# Patient Record
Sex: MALE | Race: BLACK OR AFRICAN AMERICAN | NOT HISPANIC OR LATINO | Employment: UNEMPLOYED | URBAN - METROPOLITAN AREA
[De-identification: names, ages, dates, MRNs, and addresses within clinical notes are randomized per-mention and may not be internally consistent; named-entity substitution may affect disease eponyms.]

---

## 2017-12-28 ENCOUNTER — HOSPITAL ENCOUNTER (EMERGENCY)
Facility: HOSPITAL | Age: 32
Discharge: HOME | End: 2017-12-28
Attending: EMERGENCY MEDICINE
Payer: MEDICAID

## 2017-12-28 VITALS
HEART RATE: 80 BPM | HEIGHT: 70 IN | RESPIRATION RATE: 18 BRPM | OXYGEN SATURATION: 99 % | SYSTOLIC BLOOD PRESSURE: 159 MMHG | BODY MASS INDEX: 26.2 KG/M2 | DIASTOLIC BLOOD PRESSURE: 94 MMHG | TEMPERATURE: 99 F | WEIGHT: 183 LBS

## 2017-12-28 DIAGNOSIS — N18.6 ESRD (END STAGE RENAL DISEASE) ON DIALYSIS: Primary | ICD-10-CM

## 2017-12-28 DIAGNOSIS — Z99.2 DIALYSIS PATIENT: ICD-10-CM

## 2017-12-28 DIAGNOSIS — I15.1 HYPERTENSION SECONDARY TO OTHER RENAL DISORDERS: ICD-10-CM

## 2017-12-28 DIAGNOSIS — Z99.2 ESRD (END STAGE RENAL DISEASE) ON DIALYSIS: Primary | ICD-10-CM

## 2017-12-28 LAB
ALBUMIN SERPL BCP-MCNC: 4.1 G/DL
ALP SERPL-CCNC: 83 U/L
ALT SERPL W/O P-5'-P-CCNC: 28 U/L
ANION GAP SERPL CALC-SCNC: 16 MMOL/L
AST SERPL-CCNC: 17 U/L
BASOPHILS # BLD AUTO: 0.05 K/UL
BASOPHILS NFR BLD: 1.3 %
BILIRUB SERPL-MCNC: 1.1 MG/DL
BUN SERPL-MCNC: 65 MG/DL
CALCIUM SERPL-MCNC: 9.7 MG/DL
CHLORIDE SERPL-SCNC: 108 MMOL/L
CO2 SERPL-SCNC: 21 MMOL/L
CREAT SERPL-MCNC: 13.8 MG/DL
DIFFERENTIAL METHOD: ABNORMAL
EOSINOPHIL # BLD AUTO: 0.1 K/UL
EOSINOPHIL NFR BLD: 3.5 %
ERYTHROCYTE [DISTWIDTH] IN BLOOD BY AUTOMATED COUNT: 14.7 %
EST. GFR  (AFRICAN AMERICAN): 5 ML/MIN/1.73 M^2
EST. GFR  (NON AFRICAN AMERICAN): 4 ML/MIN/1.73 M^2
FLUAV AG SPEC QL IA: NEGATIVE
FLUBV AG SPEC QL IA: NEGATIVE
GLUCOSE SERPL-MCNC: 77 MG/DL
HCT VFR BLD AUTO: 37.7 %
HGB BLD-MCNC: 12.2 G/DL
LYMPHOCYTES # BLD AUTO: 1.9 K/UL
LYMPHOCYTES NFR BLD: 52 %
MCH RBC QN AUTO: 30.5 PG
MCHC RBC AUTO-ENTMCNC: 32.4 G/DL
MCV RBC AUTO: 94 FL
MONOCYTES # BLD AUTO: 0.3 K/UL
MONOCYTES NFR BLD: 8.4 %
NEUTROPHILS # BLD AUTO: 1.3 K/UL
NEUTROPHILS NFR BLD: 34.8 %
PLATELET # BLD AUTO: 167 K/UL
PMV BLD AUTO: 11.9 FL
POTASSIUM SERPL-SCNC: 5.2 MMOL/L
PROT SERPL-MCNC: 8.3 G/DL
RBC # BLD AUTO: 4 M/UL
SODIUM SERPL-SCNC: 145 MMOL/L
SPECIMEN SOURCE: NORMAL
WBC # BLD AUTO: 3.71 K/UL

## 2017-12-28 PROCEDURE — 93010 ELECTROCARDIOGRAM REPORT: CPT | Mod: ,,, | Performed by: INTERNAL MEDICINE

## 2017-12-28 PROCEDURE — 87400 INFLUENZA A/B EACH AG IA: CPT | Mod: 59

## 2017-12-28 PROCEDURE — 99284 EMERGENCY DEPT VISIT MOD MDM: CPT | Mod: 25

## 2017-12-28 PROCEDURE — 93005 ELECTROCARDIOGRAM TRACING: CPT

## 2017-12-28 PROCEDURE — 25000003 PHARM REV CODE 250: Performed by: EMERGENCY MEDICINE

## 2017-12-28 PROCEDURE — 85025 COMPLETE CBC W/AUTO DIFF WBC: CPT

## 2017-12-28 PROCEDURE — 80053 COMPREHEN METABOLIC PANEL: CPT

## 2017-12-28 RX ORDER — LISINOPRIL 10 MG/1
10 TABLET ORAL DAILY
COMMUNITY

## 2017-12-28 RX ORDER — FOLIC ACID 1 MG/1
1 TABLET ORAL DAILY
COMMUNITY

## 2017-12-28 RX ORDER — LISINOPRIL 10 MG/1
10 TABLET ORAL
Status: COMPLETED | OUTPATIENT
Start: 2017-12-28 | End: 2017-12-28

## 2017-12-28 RX ADMIN — LISINOPRIL 10 MG: 10 TABLET ORAL at 01:12

## 2017-12-28 NOTE — ED PROVIDER NOTES
SCRIBE #1 NOTE: I, Tarik Gonzales, am scribing for, and in the presence of, Huber Cardoza MD. I have scribed the entire note.      History      Chief Complaint   Patient presents with    Fatigue     emesis, SOB. Receives dialysis MWF. Received dialysis last on Saturday.        Review of patient's allergies indicates:   Allergen Reactions    Bactrim [sulfamethoxazole-trimethoprim]     Sulfa (sulfonamide antibiotics)         HPI   HPI    12/28/2017, 11:34 AM   History obtained from the patient      History of Present Illness: Varghese Paulino is a 32 y.o. male patient who presents to the Emergency Department for fatigue which onset gradually 3 days ago. Sxs are constant, worsening, and moderate in severity. Pt last dialysis was 5 days ago. There are no mitigating or exacerbating factors noted. Associated sxs include nausea, episodic emesis, dizziness, CP.  Pt denies any fever, chills, HA, LOC, blood in stool, ABD pain, SOB, cough, sore throat, and all other sxs at this time. Pt also complaining of right foot pain. No further complaints or concerns at this time.      Arrival mode: Personal vehicle      PCP: [facility]     Past Medical History:  Past medical history reviewed not relevant      Past Surgical History:  Past surgical history reviewed not relevant      Family History:  Family history reviewed not relevant      Social History:  Social History    Social History Main Topics    Social History Main Topics    Smoking status: Unknown if ever smoked    Smokeless tobacco: Unknown if ever used    Alcohol Use: Unknown drinking history    Drug Use: Unknown if ever used    Sexual Activity: Unknown       ROS   Review of Systems   Constitutional: Positive for fatigue. Negative for fever.   HENT: Negative for sore throat.    Respiratory: Negative for shortness of breath.    Cardiovascular: Positive for chest pain.   Gastrointestinal: Positive for nausea and vomiting.   Genitourinary: Negative for  "dysuria.   Musculoskeletal: Negative for back pain.   Skin: Negative for rash.   Neurological: Positive for dizziness. Negative for weakness.   Hematological: Does not bruise/bleed easily.     Physical Exam      Initial Vitals [12/28/17 1059]   BP Pulse Resp Temp SpO2   (!) 190/95 87 18 98.6 °F (37 °C) 99 %      MAP       126.67          Physical Exam  Nursing Notes and Vital Signs Reviewed.  Constitutional: Patient is in no acute distress. Well-developed and well-nourished.  Head: Atraumatic. Normocephalic.  Eyes: PERRL. EOM intact. Conjunctivae are not pale. No scleral icterus.  ENT: Mucous membranes are moist. Oropharynx is clear and symmetric.    Neck: Supple. Full ROM. No lymphadenopathy.  Cardiovascular: Regular rate. Regular rhythm. No murmurs, rubs, or gallops. Distal pulses are 2+ and symmetric.  Pulmonary/Chest: No respiratory distress. Clear to auscultation bilaterally. No wheezing or rales.  Abdominal: Soft and non-distended.  There is no tenderness.  No rebound, guarding, or rigidity. Good bowel sounds.  Genitourinary: No CVA tenderness  Musculoskeletal: Moves all extremities. No obvious deformities. No edema. No calf tenderness.  Skin: Warm and dry. Shunt to LUE. Cath to right upper chest wall.  Neurological:  Alert, awake, and appropriate.  Normal speech.  No acute focal neurological deficits are appreciated.  Psychiatric: Normal affect. Good eye contact. Appropriate in content.    ED Course    Procedures  ED Vital Signs:  Vitals:    12/28/17 1059 12/28/17 1208   BP: (!) 190/95 (!) 159/94   Pulse: 87 80   Resp: 18 18   Temp: 98.6 °F (37 °C)    TempSrc: Oral    SpO2: 99% 99%   Weight: 83 kg (183 lb)    Height: 5' 10" (1.778 m)        Abnormal Lab Results:  Labs Reviewed   CBC W/ AUTO DIFFERENTIAL - Abnormal; Notable for the following:        Result Value    WBC 3.71 (*)     RBC 4.00 (*)     Hemoglobin 12.2 (*)     Hematocrit 37.7 (*)     RDW 14.7 (*)     Gran # 1.3 (*)     Gran% 34.8 (*)     Lymph% " 52.0 (*)     All other components within normal limits   COMPREHENSIVE METABOLIC PANEL - Abnormal; Notable for the following:     Potassium 5.2 (*)     CO2 21 (*)     BUN, Bld 65 (*)     Creatinine 13.8 (*)     Total Bilirubin 1.1 (*)     eGFR if  5 (*)     eGFR if non  4 (*)     All other components within normal limits   INFLUENZA A AND B ANTIGEN        All Lab Results:  Results for orders placed or performed during the hospital encounter of 12/28/17   CBC auto differential   Result Value Ref Range    WBC 3.71 (L) 3.90 - 12.70 K/uL    RBC 4.00 (L) 4.60 - 6.20 M/uL    Hemoglobin 12.2 (L) 14.0 - 18.0 g/dL    Hematocrit 37.7 (L) 40.0 - 54.0 %    MCV 94 82 - 98 fL    MCH 30.5 27.0 - 31.0 pg    MCHC 32.4 32.0 - 36.0 g/dL    RDW 14.7 (H) 11.5 - 14.5 %    Platelets 167 150 - 350 K/uL    MPV 11.9 9.2 - 12.9 fL    Gran # 1.3 (L) 1.8 - 7.7 K/uL    Lymph # 1.9 1.0 - 4.8 K/uL    Mono # 0.3 0.3 - 1.0 K/uL    Eos # 0.1 0.0 - 0.5 K/uL    Baso # 0.05 0.00 - 0.20 K/uL    Gran% 34.8 (L) 38.0 - 73.0 %    Lymph% 52.0 (H) 18.0 - 48.0 %    Mono% 8.4 4.0 - 15.0 %    Eosinophil% 3.5 0.0 - 8.0 %    Basophil% 1.3 0.0 - 1.9 %    Differential Method Automated    Comprehensive metabolic panel   Result Value Ref Range    Sodium 145 136 - 145 mmol/L    Potassium 5.2 (H) 3.5 - 5.1 mmol/L    Chloride 108 95 - 110 mmol/L    CO2 21 (L) 23 - 29 mmol/L    Glucose 77 70 - 110 mg/dL    BUN, Bld 65 (H) 6 - 20 mg/dL    Creatinine 13.8 (H) 0.5 - 1.4 mg/dL    Calcium 9.7 8.7 - 10.5 mg/dL    Total Protein 8.3 6.0 - 8.4 g/dL    Albumin 4.1 3.5 - 5.2 g/dL    Total Bilirubin 1.1 (H) 0.1 - 1.0 mg/dL    Alkaline Phosphatase 83 55 - 135 U/L    AST 17 10 - 40 U/L    ALT 28 10 - 44 U/L    Anion Gap 16 8 - 16 mmol/L    eGFR if African American 5 (A) >60 mL/min/1.73 m^2    eGFR if non African American 4 (A) >60 mL/min/1.73 m^2   Influenza antigen Nasopharyngeal Swab   Result Value Ref Range    Influenza A Ag, EIA Negative Negative     Influenza B Ag, EIA Negative Negative    Flu A & B Source Nasopharyngeal Swab          Imaging Results:  Imaging Results          X-Ray Chest PA And Lateral (Final result)  Result time 12/28/17 11:45:36    Final result by Joel Parry III, MD (12/28/17 11:45:36)                 Impression:     No acute disease identified in the chest.      Electronically signed by: JOEL PARRY MD  Date:     12/28/17  Time:    11:45              Narrative:    XR CHEST PA AND LATERAL    Clinical history: weakness    Findings: The lungs appear clear of active disease. No acute appearing infiltrate, pleural effusion, pneumothorax or other acute disease identified.  The right jugular Vas-Cath is satisfactory in position terminating just below the cavoatrial junction. Cardiomediastinal silhouette is within normal limits.  No acute osseous abnormality detected.                                      The EKG was ordered, reviewed, and independently interpreted by the ED provider.  Interpretation time: 11:34  Rate: 82 BPM  Rhythm: normal sinus rhythm  Interpretation: No acute ST changes. Early repolarrization. No STEMI.      The Emergency Provider reviewed the vital signs and test results, which are outlined above.    ED Discussion     12:23 PM: Reassessed pt. Pt states their condition has improved at this time.  Discussed with pt all pertinent ED information and results. Discussed plan of treatment with pt. Gave pt all f/u and return to the ED instructions. All questions and concerns were addressed at this time. Pt understands and agrees to plan as discussed. Pt is stable for discharge.       ED Medication(s):  Medications   lisinopril tablet 10 mg (10 mg Oral Given 12/28/17 1300)       Discharge Medication List as of 12/28/2017 12:18 PM          Follow-up Information     PCP In 1 day.                   Medical Decision Making    Medical Decision Making:   Clinical Tests:   Lab Tests: Reviewed and Ordered  Radiological Study: Reviewed and  Ordered  Medical Tests: Reviewed and Ordered           Scribe Attestation:   Scribe #1: I performed the above scribed service and the documentation accurately describes the services I performed. I attest to the accuracy of the note.    Attending:   Physician Attestation Statement for Scribe #1: I, Huber Cardoza MD, personally performed the services described in this documentation, as scribed by Tarik Gonzales, in my presence, and it is both accurate and complete.          Clinical Impression       ICD-10-CM ICD-9-CM   1. ESRD (end stage renal disease) on dialysis N18.6 585.6    Z99.2 V45.11   2. Dialysis patient Z99.2 V45.11   3. Hypertension secondary to other renal disorders I15.1 405.91    N28.89 593.89       Disposition:   Disposition: Discharged  Condition: Stable         Huber Cardoza MD  12/28/17 3165

## 2018-01-02 ENCOUNTER — HOSPITAL ENCOUNTER (EMERGENCY)
Facility: HOSPITAL | Age: 33
Discharge: HOME | End: 2018-01-02
Attending: EMERGENCY MEDICINE
Payer: MEDICAID

## 2018-01-02 VITALS
OXYGEN SATURATION: 99 % | HEART RATE: 100 BPM | HEIGHT: 71 IN | BODY MASS INDEX: 25.16 KG/M2 | TEMPERATURE: 99 F | WEIGHT: 179.69 LBS | SYSTOLIC BLOOD PRESSURE: 134 MMHG | RESPIRATION RATE: 15 BRPM | DIASTOLIC BLOOD PRESSURE: 77 MMHG

## 2018-01-02 DIAGNOSIS — I50.1 PULMONARY EDEMA CARDIAC CAUSE: ICD-10-CM

## 2018-01-02 DIAGNOSIS — R07.9 CHEST PAIN: ICD-10-CM

## 2018-01-02 DIAGNOSIS — R11.0 NAUSEA: ICD-10-CM

## 2018-01-02 DIAGNOSIS — I10 ESSENTIAL HYPERTENSION: ICD-10-CM

## 2018-01-02 DIAGNOSIS — Z91.199 NON COMPLIANCE WITH MEDICAL TREATMENT: ICD-10-CM

## 2018-01-02 DIAGNOSIS — N18.6 ESRD (END STAGE RENAL DISEASE): ICD-10-CM

## 2018-01-02 DIAGNOSIS — R06.02 SHORTNESS OF BREATH: ICD-10-CM

## 2018-01-02 DIAGNOSIS — N18.6 ESRD (END STAGE RENAL DISEASE) ON DIALYSIS: Primary | ICD-10-CM

## 2018-01-02 DIAGNOSIS — Z99.2 ESRD (END STAGE RENAL DISEASE) ON DIALYSIS: Primary | ICD-10-CM

## 2018-01-02 LAB
ALBUMIN SERPL BCP-MCNC: 4.1 G/DL
ALP SERPL-CCNC: 79 U/L
ALT SERPL W/O P-5'-P-CCNC: 14 U/L
ANION GAP SERPL CALC-SCNC: 17 MMOL/L
AST SERPL-CCNC: 16 U/L
BASOPHILS # BLD AUTO: 0.06 K/UL
BASOPHILS NFR BLD: 1.6 %
BILIRUB SERPL-MCNC: 1.5 MG/DL
BNP SERPL-MCNC: 62 PG/ML
BUN SERPL-MCNC: 86 MG/DL
CALCIUM SERPL-MCNC: 9.4 MG/DL
CHLORIDE SERPL-SCNC: 109 MMOL/L
CO2 SERPL-SCNC: 16 MMOL/L
CREAT SERPL-MCNC: 17.2 MG/DL
DIFFERENTIAL METHOD: ABNORMAL
EOSINOPHIL # BLD AUTO: 0.1 K/UL
EOSINOPHIL NFR BLD: 3.2 %
ERYTHROCYTE [DISTWIDTH] IN BLOOD BY AUTOMATED COUNT: 14.9 %
EST. GFR  (AFRICAN AMERICAN): 4 ML/MIN/1.73 M^2
EST. GFR  (NON AFRICAN AMERICAN): 3 ML/MIN/1.73 M^2
GLUCOSE SERPL-MCNC: 78 MG/DL
HCT VFR BLD AUTO: 35.9 %
HGB BLD-MCNC: 11.6 G/DL
LYMPHOCYTES # BLD AUTO: 1.8 K/UL
LYMPHOCYTES NFR BLD: 46.4 %
MCH RBC QN AUTO: 30.2 PG
MCHC RBC AUTO-ENTMCNC: 32.3 G/DL
MCV RBC AUTO: 94 FL
MONOCYTES # BLD AUTO: 0.3 K/UL
MONOCYTES NFR BLD: 8.2 %
NEUTROPHILS # BLD AUTO: 1.5 K/UL
NEUTROPHILS NFR BLD: 40.6 %
PLATELET # BLD AUTO: 164 K/UL
PMV BLD AUTO: 12.1 FL
POTASSIUM SERPL-SCNC: 5 MMOL/L
PROT SERPL-MCNC: 8 G/DL
RBC # BLD AUTO: 3.84 M/UL
SODIUM SERPL-SCNC: 142 MMOL/L
TROPONIN I SERPL DL<=0.01 NG/ML-MCNC: <0.006 NG/ML
WBC # BLD AUTO: 3.77 K/UL

## 2018-01-02 PROCEDURE — 96375 TX/PRO/DX INJ NEW DRUG ADDON: CPT

## 2018-01-02 PROCEDURE — 96374 THER/PROPH/DIAG INJ IV PUSH: CPT

## 2018-01-02 PROCEDURE — 63600175 PHARM REV CODE 636 W HCPCS: Performed by: INTERNAL MEDICINE

## 2018-01-02 PROCEDURE — 84484 ASSAY OF TROPONIN QUANT: CPT

## 2018-01-02 PROCEDURE — 80053 COMPREHEN METABOLIC PANEL: CPT

## 2018-01-02 PROCEDURE — 83880 ASSAY OF NATRIURETIC PEPTIDE: CPT

## 2018-01-02 PROCEDURE — 99285 EMERGENCY DEPT VISIT HI MDM: CPT | Mod: ,,, | Performed by: INTERNAL MEDICINE

## 2018-01-02 PROCEDURE — 63600175 PHARM REV CODE 636 W HCPCS: Performed by: EMERGENCY MEDICINE

## 2018-01-02 PROCEDURE — 93010 ELECTROCARDIOGRAM REPORT: CPT | Mod: ,,, | Performed by: INTERNAL MEDICINE

## 2018-01-02 PROCEDURE — 85025 COMPLETE CBC W/AUTO DIFF WBC: CPT

## 2018-01-02 PROCEDURE — 80100016 HC MAINTENANCE HEMODIALYSIS

## 2018-01-02 PROCEDURE — 99284 EMERGENCY DEPT VISIT MOD MDM: CPT | Mod: 25

## 2018-01-02 RX ORDER — ONDANSETRON 2 MG/ML
4 INJECTION INTRAMUSCULAR; INTRAVENOUS ONCE
Status: DISCONTINUED | OUTPATIENT
Start: 2018-01-02 | End: 2018-01-02

## 2018-01-02 RX ORDER — ONDANSETRON 2 MG/ML
4 INJECTION INTRAMUSCULAR; INTRAVENOUS ONCE
Status: COMPLETED | OUTPATIENT
Start: 2018-01-02 | End: 2018-01-02

## 2018-01-02 RX ORDER — ONDANSETRON 4 MG/1
4 TABLET, ORALLY DISINTEGRATING ORAL EVERY 8 HOURS PRN
Qty: 12 TABLET | Refills: 0 | Status: SHIPPED | OUTPATIENT
Start: 2018-01-02

## 2018-01-02 RX ORDER — HEPARIN SODIUM 1000 [USP'U]/ML
5000 INJECTION, SOLUTION INTRAVENOUS; SUBCUTANEOUS
Status: DISCONTINUED | OUTPATIENT
Start: 2018-01-02 | End: 2018-01-02 | Stop reason: HOSPADM

## 2018-01-02 RX ADMIN — HEPARIN SODIUM 5000 UNITS: 1000 INJECTION, SOLUTION INTRAVENOUS; SUBCUTANEOUS at 03:01

## 2018-01-02 RX ADMIN — ONDANSETRON 4 MG: 2 INJECTION, SOLUTION INTRAMUSCULAR; INTRAVENOUS at 12:01

## 2018-01-02 NOTE — SUBJECTIVE & OBJECTIVE
Past Medical History:   Diagnosis Date    Hypertension     Pulmonary embolus, left     Renal disorder     dialysis 3x wk       Past Surgical History:   Procedure Laterality Date    AV FISTULA PLACEMENT      left antecubical    FOOT SURGERY      right 3rd toe amputated    vas cath placement      r chestwall       Review of patient's allergies indicates:   Allergen Reactions    Bactrim [sulfamethoxazole-trimethoprim]     Sulfa (sulfonamide antibiotics)      Current Facility-Administered Medications   Medication Frequency    heparin (porcine) injection 5,000 Units PRN     Current Outpatient Prescriptions   Medication    apixaban 2.5 mg Tab    folic acid (FOLVITE) 1 MG tablet    lisinopril 10 MG tablet     Family History     Problem Relation (Age of Onset)    Diabetes Mother    Heart disease Mother    Hypertension Father        Social History Main Topics    Smoking status: Never Smoker    Smokeless tobacco: Never Used    Alcohol use No    Drug use: No    Sexual activity: Not on file     Review of Systems   Constitutional: Positive for fatigue.   HENT: Negative.    Respiratory: Positive for shortness of breath.    Cardiovascular: Negative.    Gastrointestinal: Negative.    Genitourinary: Negative.    Musculoskeletal: Negative.    Psychiatric/Behavioral: Negative.      Objective:     Vital Signs (Most Recent):  Temp: (!) 57.2 °F (14 °C) (01/02/18 1530)  Pulse: 100 (01/02/18 1600)  Resp: 15 (01/02/18 1246)  BP: 130/86 (01/02/18 1600)  SpO2: 100 % (01/02/18 1246)  O2 Device (Oxygen Therapy): room air (01/02/18 1007) Vital Signs (24h Range):  Temp:  [57.2 °F (14 °C)-98.5 °F (36.9 °C)] 57.2 °F (14 °C)  Pulse:  [] 100  Resp:  [15-19] 15  SpO2:  [100 %] 100 %  BP: (130-202)/() 130/86     Weight: 81.5 kg (179 lb 10.8 oz) (01/02/18 1007)  Body mass index is 25.42 kg/m².  Body surface area is 2.01 meters squared.    No intake/output data recorded.    Physical Exam   Constitutional: He is oriented to  person, place, and time. He appears well-developed and well-nourished.   HENT:   Head: Normocephalic and atraumatic.   Cardiovascular: Normal rate, regular rhythm and normal heart sounds.  Exam reveals no friction rub.    Pulmonary/Chest: Effort normal. He has rales.   Abdominal: Soft. Bowel sounds are normal. There is no tenderness.   Musculoskeletal: He exhibits edema.   Neurological: He is alert and oriented to person, place, and time.   Skin: Skin is dry.   Psychiatric: He has a normal mood and affect. His behavior is normal.   Nursing note and vitals reviewed.      Significant Labs: reviewed  BMP  Lab Results   Component Value Date     01/02/2018    K 5.0 01/02/2018     01/02/2018    CO2 16 (L) 01/02/2018    BUN 86 (H) 01/02/2018    CREATININE 17.2 (H) 01/02/2018    CALCIUM 9.4 01/02/2018    ANIONGAP 17 (H) 01/02/2018    ESTGFRAFRICA 4 (A) 01/02/2018    EGFRNONAA 3 (A) 01/02/2018     Lab Results   Component Value Date    WBC 3.77 (L) 01/02/2018    HGB 11.6 (L) 01/02/2018    HCT 35.9 (L) 01/02/2018    MCV 94 01/02/2018     01/02/2018       Significant Imaging: CXR reviewed, pulmonary edema

## 2018-01-02 NOTE — HPI
Pt was seen and examined. H/o reviewed. Pt is a 34 y/o male with ESRD on chronic HD q MWF at [facility] who presented to ER with SOB. Pt has been in [place] since Christmas and has not been dialyzed since. Pt's excuse is that he did not expect to stay this long. He did not make arrangements through his SW to [place] HD in one of the local out pt units. He has SOB, but no CP, no fever, no cough, on presentation. The SOB had built up slowly in the past few days. Orders for HD was given and discussed with HD nurse. He was revisited and reevalauted by me during HD treatment. He was tolerating HD well, no new c/o's. No discomfort during HD.

## 2018-01-02 NOTE — ED PROVIDER NOTES
SCRIBE #1 NOTE: I, Natalie Chang, am scribing for, and in the presence of, Anthony Carrasco MD. I have scribed the entire note.      History      Chief Complaint   Patient presents with    Shortness of Breath     Nausea. Has not has dialysis since Saturday before Christmas.        Review of patient's allergies indicates:   Allergen Reactions    Bactrim [sulfamethoxazole-trimethoprim]     Sulfa (sulfonamide antibiotics)         HPI   HPI    1/2/2018, 10:52 AM   History obtained from the patient      History of Present Illness: Varghese Paulino is a 32 y.o. male patient who presents to the Emergency Department for SOB which onset gradually two days ago. Pt stated he is in town visiting for the holidays and his cousins were suppose to come get him so he can go to dialysis. Pt stated every time he calls them his cousins tell him they are on their way. Pt reports having to dialyze three times a week. Pt reports being compliant with dialysis besides this encounter. Symptoms are constant and moderate in severity. No mitigating or exacerbating factors reported. Associated sxs include CP, n/v, and fatigue. Patient denies any fever, chills, recent travel/long car trips, dizziness, HA, light-headedness, extremity weakness/numbness, and all other sxs at this time. No prior Tx reported. No further complaints or concerns at this time.         Arrival mode: Personal vehicle    PCP: [facility]       Past Medical History:  Past Medical History:   Diagnosis Date    Hypertension     Pulmonary embolus, left     Renal disorder     dialysis 3x wk       Past Surgical History:  Past Surgical History:   Procedure Laterality Date    AV FISTULA PLACEMENT      left antecubical    FOOT SURGERY      right 3rd toe amputated    vas cath placement      r chestwall         Family History:  Family History   Problem Relation Age of Onset    Heart disease Mother     Diabetes Mother     Hypertension Father        Social History:  Social  History     Social History Main Topics    Smoking status: Never Smoker    Smokeless tobacco: Never Used    Alcohol use No    Drug use: No    Sexual activity: Unknown       ROS   Review of Systems   Constitutional: Positive for fatigue. Negative for chills and fever.        (-) Recent travel/long car trips   HENT: Negative for sore throat.    Respiratory: Positive for shortness of breath.    Cardiovascular: Positive for chest pain.   Gastrointestinal: Positive for nausea and vomiting.   Genitourinary: Negative for dysuria and hematuria.   Musculoskeletal: Negative for back pain.   Skin: Negative for rash.   Neurological: Negative for dizziness, weakness, light-headedness, numbness and headaches.   Hematological: Does not bruise/bleed easily.   All other systems reviewed and are negative.    Physical Exam      Initial Vitals [01/02/18 1007]   BP Pulse Resp Temp SpO2   (!) 202/106 90 16 97.8 °F (36.6 °C) 100 %      MAP       138          Physical Exam  Nursing Notes and Vital Signs Reviewed.  Constitutional: Patient is in no acute distress. Well-developed and well-nourished.  Head: Atraumatic. Normocephalic.  Eyes: PERRL. EOM intact. Conjunctivae are not pale. No scleral icterus.  ENT: Mucous membranes are moist. Oropharynx is clear and symmetric.    Neck: Supple. Full ROM. No lymphadenopathy.  Cardiovascular: Regular rate. Regular rhythm. No murmurs, rubs, or gallops. Distal pulses are 2+ and symmetric.  Pulmonary/Chest: No respiratory distress. Clear to auscultation bilaterally. No wheezing or rales.  Abdominal: Soft and non-distended.  There is no tenderness.  No rebound, guarding, or rigidity. Good bowel sounds.  Musculoskeletal: Moves all extremities. No obvious deformities. No edema. No calf tenderness.  Skin: Warm and dry.  Neurological:  Alert, awake, and appropriate.  Normal speech.  No acute focal neurological deficits are appreciated.  Psychiatric: Normal affect. Good eye contact. Appropriate in  content.    ED Course    Procedures  ED Vital Signs:  Vitals:    01/02/18 1044 01/02/18 1102 01/02/18 1246 01/02/18 1325   BP: (!) 160/88 (!) 158/96 (!) 166/96 (!) 154/87   Pulse: 84 81 80 80   Resp: 19 16 15    Temp:    98.5 °F (36.9 °C)   TempSrc:       SpO2: 100% 100% 100%    Weight:       Height:        01/02/18 1330 01/02/18 1400 01/02/18 1430 01/02/18 1500   BP: (!) 147/93 (!) 153/92 (!) 146/90 (!) 143/94   Pulse: 80 84 88 94   Resp:       Temp:       TempSrc:       SpO2:       Weight:       Height:        01/02/18 1530 01/02/18 1600 01/02/18 1630 01/02/18 1700   BP: (!) 144/93 130/86 120/75 124/74   Pulse: 100 100 108 106   Resp:       Temp:       TempSrc:       SpO2:       Weight:       Height:        01/02/18 1730 01/02/18 1741 01/02/18 1802   BP: 123/79 123/78 134/77   Pulse: 104 102 100   Resp:      Temp:  98 °F (36.7 °C) 98.8 °F (37.1 °C)   TempSrc:   Oral   SpO2:   99%   Weight:      Height:          Abnormal Lab Results:  Labs Reviewed   CBC W/ AUTO DIFFERENTIAL - Abnormal; Notable for the following:        Result Value    WBC 3.77 (*)     RBC 3.84 (*)     Hemoglobin 11.6 (*)     Hematocrit 35.9 (*)     RDW 14.9 (*)     Gran # 1.5 (*)     All other components within normal limits   COMPREHENSIVE METABOLIC PANEL - Abnormal; Notable for the following:     CO2 16 (*)     BUN, Bld 86 (*)     Creatinine 17.2 (*)     Total Bilirubin 1.5 (*)     Anion Gap 17 (*)     eGFR if  4 (*)     eGFR if non  3 (*)     All other components within normal limits   TROPONIN I   B-TYPE NATRIURETIC PEPTIDE        All Lab Results:  Results for orders placed or performed during the hospital encounter of 01/02/18   CBC auto differential   Result Value Ref Range    WBC 3.77 (L) 3.90 - 12.70 K/uL    RBC 3.84 (L) 4.60 - 6.20 M/uL    Hemoglobin 11.6 (L) 14.0 - 18.0 g/dL    Hematocrit 35.9 (L) 40.0 - 54.0 %    MCV 94 82 - 98 fL    MCH 30.2 27.0 - 31.0 pg    MCHC 32.3 32.0 - 36.0 g/dL    RDW 14.9 (H)  11.5 - 14.5 %    Platelets 164 150 - 350 K/uL    MPV 12.1 9.2 - 12.9 fL    Gran # 1.5 (L) 1.8 - 7.7 K/uL    Lymph # 1.8 1.0 - 4.8 K/uL    Mono # 0.3 0.3 - 1.0 K/uL    Eos # 0.1 0.0 - 0.5 K/uL    Baso # 0.06 0.00 - 0.20 K/uL    Gran% 40.6 38.0 - 73.0 %    Lymph% 46.4 18.0 - 48.0 %    Mono% 8.2 4.0 - 15.0 %    Eosinophil% 3.2 0.0 - 8.0 %    Basophil% 1.6 0.0 - 1.9 %    Differential Method Automated    Comprehensive metabolic panel   Result Value Ref Range    Sodium 142 136 - 145 mmol/L    Potassium 5.0 3.5 - 5.1 mmol/L    Chloride 109 95 - 110 mmol/L    CO2 16 (L) 23 - 29 mmol/L    Glucose 78 70 - 110 mg/dL    BUN, Bld 86 (H) 6 - 20 mg/dL    Creatinine 17.2 (H) 0.5 - 1.4 mg/dL    Calcium 9.4 8.7 - 10.5 mg/dL    Total Protein 8.0 6.0 - 8.4 g/dL    Albumin 4.1 3.5 - 5.2 g/dL    Total Bilirubin 1.5 (H) 0.1 - 1.0 mg/dL    Alkaline Phosphatase 79 55 - 135 U/L    AST 16 10 - 40 U/L    ALT 14 10 - 44 U/L    Anion Gap 17 (H) 8 - 16 mmol/L    eGFR if African American 4 (A) >60 mL/min/1.73 m^2    eGFR if non African American 3 (A) >60 mL/min/1.73 m^2   Troponin I #1   Result Value Ref Range    Troponin I <0.006 0.000 - 0.026 ng/mL   B-Type natriuretic peptide (BNP)   Result Value Ref Range    BNP 62 0 - 99 pg/mL         Imaging Results:  Imaging Results          X-Ray Chest AP Portable (Final result)  Result time 01/02/18 10:57:34    Final result by ADELE Johnson Sr., MD (01/02/18 10:57:34)                 Impression:        1. There has been interval development of a streaky opacity in the base of the left lung. This is characteristic of subsegmental atelectasis.  2. A right subclavian venous line remains in place.       Electronically signed by: ADELE JOHNSON MD  Date:     01/02/18  Time:    10:57              Narrative:    One-view chest x-ray    Clinical History: Chest pain    Finding: Comparison was made to a prior examination performed on 12/28/2017. A right subclavian venous line remains in place. The size of the heart  is normal. There has been interval development of a streaky opacity in the base of the left lung. The right lung is clear. There is no pneumothorax.  The costophrenic angles are sharp.                             The EKG was ordered, reviewed, and independently interpreted by the ED provider.  Interpretation time: 1038  Rate: 85 BPM  Rhythm: normal sinus rhythm  Interpretation: Normal EKG. No STEMI.         The Emergency Provider reviewed the vital signs and test results, which are outlined above.    ED Discussion     12:11 PM: Dr. Carrasco discussed the pt's case with Dr. Varela (Nephrology) who recommends pt will be dialyzed here and then will be able to be discharged home.  The patient agrees with this treatment plan.    4:14 PM: Reassessed pt at this time.  Pt states his condition has improved at this time. Discussed with pt all pertinent ED information and results. Discussed pt dx and plan of tx. Gave pt all f/u and return to the ED instructions. All questions and concerns were addressed at this time. Pt expresses understanding of information and instructions, and is comfortable with plan to discharge. Pt is stable for discharge.    ED Medication(s):  Medications   ondansetron injection 4 mg (4 mg Intravenous Given 1/2/18 1212)       Discharge Medication List as of 1/2/2018  5:57 PM      START taking these medications    Details   ondansetron (ZOFRAN-ODT) 4 MG TbDL Take 1 tablet (4 mg total) by mouth every 8 (eight) hours as needed., Starting Tue 1/2/2018, Print             Follow-up Information     [facility]. Schedule an appointment as soon as possible for a visit in 2 days.    Why:  Follow up with your primary doctor in the next 2-3 days for a re-check.  Return to the emergency department for any worsening signs or symptoms.                   Medical Decision Making    Medical Decision Making:   Clinical Tests:   Lab Tests: Ordered and Reviewed  Radiological Study: Ordered and Reviewed  Medical Tests:  Ordered and Reviewed           Scribe Attestation:   Scribe #1: I performed the above scribed service and the documentation accurately describes the services I performed. I attest to the accuracy of the note.    Attending:   Physician Attestation Statement for Scribe #1: I, Anthony Carrasco MD, personally performed the services described in this documentation, as scribed by Natalie Chang, in my presence, and it is both accurate and complete.          Clinical Impression       ICD-10-CM ICD-9-CM   1. ESRD (end stage renal disease) on dialysis N18.6 585.6    Z99.2 V45.11   2. Chest pain R07.9 786.50   3. ESRD (end stage renal disease) N18.6 585.6   4. Shortness of breath R06.02 786.05   5. Essential hypertension I10 401.9   6. Pulmonary edema cardiac cause I50.1 514   7. Non compliance with medical treatment Z91.19 V15.81   8. Nausea R11.0 787.02       Disposition:   Disposition: Discharged  Condition: Stable         Anthony Carrasco MD  01/03/18 9590

## 2018-01-02 NOTE — PROGRESS NOTES
Pt completed 4 hours of HD removing 2.5L. Dr. Varela @ bedside discussing compliance with pt. Pt tolerated HD well. No signs of distress noted. Vas cath de accessed per p&p  Hep locked 2.2ml x 2.

## 2018-01-02 NOTE — ASSESSMENT & PLAN NOTE
Renal: ESRD pt form [facility]  q MWF HD  Pt has not dialyzed for about 10 days.  Pulmonary edema  Hyperkalemia  Metabolic acidosis  HTN: due to fluid overload and pulmonary edema  BP markedly improved during HD, as reviewed with UF  Tolerating HD well. Continue HD  Risk of sudden cardiac death with not dialyzing repeatedly explained to pt.  Pt was informed that he actually can get the SW in his HD unit arrange out pt HD treatment for him when he goes to a new location.  Discussed with ER  Pt being discharged directly from the ER post HD today  Pt was strongly advised to return to his HD unit at [facility] tomorrow.  Opportunity for questions and discussion provided.  Total time spent 70 minutes including time needed to review the records, the   patient evaluation, documentation, face-to-face discussion with the patient,   more than 50% of the time was spent on coordination of care and counseling.    Level V visit.  Pt received multiple visits and evaluations.

## 2018-01-02 NOTE — CONSULTS
[facility]  Nephrology  Consult Note    Patient Name: Varghese Paulino  MRN: [id number]  Admission Date: 1/2/2018  Hospital Length of Stay: 0 days  Attending Provider: Anthony Carrasco MD   Primary Care Physician: Provider Notinsystem  Principal Problem: ESRD on HD, SOB    Referring physician: Dr. Carrasco  Reason for referral: ESRD on HD      Consults  Subjective:     HPI: Pt was seen and examined. H/o reviewed. Pt is a 32 y/o male with ESRD on chronic HD q MWF at [facility] who presented to ER with SOB. Pt has been in [place] since [place] and has not been dialyzed since. Pt's excuse is that he did not expect to stay this long. He did not make arrangements through his SW to [place] HD in one of the local out pt units. He has SOB, but no CP, no fever, no cough, on presentation. The SOB had built up slowly in the past few days. Orders for HD was given and discussed with HD nurse. He was revisited and reevalauted by me during HD treatment. He was tolerating HD well, no new c/o's. No discomfort during HD.    Past Medical History:   Diagnosis Date    Hypertension     Pulmonary embolus, left     Renal disorder     dialysis 3x wk       Past Surgical History:   Procedure Laterality Date    AV FISTULA PLACEMENT      left antecubical    FOOT SURGERY      right 3rd toe amputated    vas cath placement      r chestwall       Review of patient's allergies indicates:   Allergen Reactions    Bactrim [sulfamethoxazole-trimethoprim]     Sulfa (sulfonamide antibiotics)      Current Facility-Administered Medications   Medication Frequency    heparin (porcine) injection 5,000 Units PRN     Current Outpatient Prescriptions   Medication    apixaban 2.5 mg Tab    folic acid (FOLVITE) 1 MG tablet    lisinopril 10 MG tablet     Family History     Problem Relation (Age of Onset)    Diabetes Mother    Heart disease Mother    Hypertension Father        Social History Main Topics    Smoking status: Never Smoker    Smokeless  tobacco: Never Used    Alcohol use No    Drug use: No    Sexual activity: Not on file     Review of Systems   Constitutional: Positive for fatigue.   HENT: Negative.    Respiratory: Positive for shortness of breath.    Cardiovascular: Negative.    Gastrointestinal: Negative.    Genitourinary: Negative.    Musculoskeletal: Negative.    Psychiatric/Behavioral: Negative.      Objective:     Vital Signs (Most Recent):  Temp: (!) 57.2 °F (14 °C) (01/02/18 1530)  Pulse: 100 (01/02/18 1600)  Resp: 15 (01/02/18 1246)  BP: 130/86 (01/02/18 1600)  SpO2: 100 % (01/02/18 1246)  O2 Device (Oxygen Therapy): room air (01/02/18 1007) Vital Signs (24h Range):  Temp:  [57.2 °F (14 °C)-98.5 °F (36.9 °C)] 57.2 °F (14 °C)  Pulse:  [] 100  Resp:  [15-19] 15  SpO2:  [100 %] 100 %  BP: (130-202)/() 130/86     Weight: 81.5 kg (179 lb 10.8 oz) (01/02/18 1007)  Body mass index is 25.42 kg/m².  Body surface area is 2.01 meters squared.    No intake/output data recorded.    Physical Exam   Constitutional: He is oriented to person, place, and time. He appears well-developed and well-nourished.   HENT:   Head: Normocephalic and atraumatic.   Cardiovascular: Normal rate, regular rhythm and normal heart sounds.  Exam reveals no friction rub.    Pulmonary/Chest: Effort normal. He has rales.   Abdominal: Soft. Bowel sounds are normal. There is no tenderness.   Musculoskeletal: He exhibits edema.   Neurological: He is alert and oriented to person, place, and time.   Skin: Skin is dry.   Psychiatric: He has a normal mood and affect. His behavior is normal.   Nursing note and vitals reviewed.      Significant Labs: reviewed  BMP  Lab Results   Component Value Date     01/02/2018    K 5.0 01/02/2018     01/02/2018    CO2 16 (L) 01/02/2018    BUN 86 (H) 01/02/2018    CREATININE 17.2 (H) 01/02/2018    CALCIUM 9.4 01/02/2018    ANIONGAP 17 (H) 01/02/2018    ESTGFRAFRICA 4 (A) 01/02/2018    EGFRNONAA 3 (A) 01/02/2018     Lab  Results   Component Value Date    WBC 3.77 (L) 01/02/2018    HGB 11.6 (L) 01/02/2018    HCT 35.9 (L) 01/02/2018    MCV 94 01/02/2018     01/02/2018       Significant Imaging: CXR reviewed, pulmonary edema    Assessment/Plan:   31 y/o male with ESRD on chronic HD presented to ER with SOB afer missing Hd for about 10 days:    ESRD (end stage renal disease)    Renal: ESRD [place] form [place]  q MWF HD  Pt has not dialyzed for about 10 days.  Pulmonary edema  Hyperkalemia  Metabolic acidosis  HTN: due to fluid overload and pulmonary edema  BP markedly improved during HD, as reviewed with UF  Tolerating HD well. Continue HD  Risk of sudden cardiac death with not dialyzing repeatedly explained to pt.  Pt was informed that he actually can get the SW in his HD unit arrange out pt HD treatment for him when he goes to a new location.  Discussed with ER  Pt being discharged directly from the ER post HD today  Pt was strongly advised to return to his HD unit at [facility] tomorrow.  Opportunity for questions and discussion provided.  Total time spent 70 minutes including time needed to review the records, the   patient evaluation, documentation, face-to-face discussion with the patient,   more than 50% of the time was spent on coordination of care and counseling.    Level V visit.  Pt received multiple visits and evaluations.              Hung Varela MD  Nephrology  [facility]

## 2018-09-17 ENCOUNTER — OFFICE VISIT (OUTPATIENT)
Dept: PODIATRY | Facility: CLINIC | Age: 33
End: 2018-09-17
Payer: MEDICARE

## 2018-09-17 VITALS
SYSTOLIC BLOOD PRESSURE: 140 MMHG | RESPIRATION RATE: 16 BRPM | DIASTOLIC BLOOD PRESSURE: 84 MMHG | HEIGHT: 71 IN | BODY MASS INDEX: 21.14 KG/M2 | HEART RATE: 106 BPM | WEIGHT: 151 LBS

## 2018-09-17 DIAGNOSIS — M24.572 CONTRACTURE, LEFT ANKLE: ICD-10-CM

## 2018-09-17 DIAGNOSIS — L97.512 DIABETIC ULCER OF OTHER PART OF RIGHT FOOT ASSOCIATED WITH TYPE 2 DIABETES MELLITUS, WITH FAT LAYER EXPOSED: ICD-10-CM

## 2018-09-17 DIAGNOSIS — Z89.421 ACQUIRED ABSENCE OF OTHER RIGHT TOE(S): ICD-10-CM

## 2018-09-17 DIAGNOSIS — E11.42 TYPE 2 DIABETES MELLITUS WITH PERIPHERAL NEUROPATHY: ICD-10-CM

## 2018-09-17 DIAGNOSIS — L84 CALLUS: ICD-10-CM

## 2018-09-17 DIAGNOSIS — N18.6 TYPE 2 DIABETES MELLITUS WITH ESRD (END-STAGE RENAL DISEASE): ICD-10-CM

## 2018-09-17 DIAGNOSIS — E11.621 DIABETIC ULCER OF OTHER PART OF RIGHT FOOT ASSOCIATED WITH TYPE 2 DIABETES MELLITUS, WITH FAT LAYER EXPOSED: ICD-10-CM

## 2018-09-17 DIAGNOSIS — M77.41 METATARSALGIA, RIGHT FOOT: ICD-10-CM

## 2018-09-17 DIAGNOSIS — L85.3 XEROSIS CUTIS: ICD-10-CM

## 2018-09-17 DIAGNOSIS — M20.42 HAMMER TOES OF BOTH FEET: ICD-10-CM

## 2018-09-17 DIAGNOSIS — M24.571 CONTRACTURE, RIGHT ANKLE: ICD-10-CM

## 2018-09-17 DIAGNOSIS — M20.41 HAMMER TOES OF BOTH FEET: ICD-10-CM

## 2018-09-17 DIAGNOSIS — M77.42 METATARSALGIA OF LEFT FOOT: ICD-10-CM

## 2018-09-17 DIAGNOSIS — B35.1 ONYCHOMYCOSIS: ICD-10-CM

## 2018-09-17 DIAGNOSIS — E11.22 TYPE 2 DIABETES MELLITUS WITH ESRD (END-STAGE RENAL DISEASE): ICD-10-CM

## 2018-09-17 DIAGNOSIS — N18.6 ESRD (END STAGE RENAL DISEASE): ICD-10-CM

## 2018-09-17 DIAGNOSIS — E11.9 ENCOUNTER FOR COMPREHENSIVE DIABETIC FOOT EXAMINATION, TYPE 2 DIABETES MELLITUS: Primary | ICD-10-CM

## 2018-09-17 PROCEDURE — 11057 PARNG/CUTG B9 HYPRKR LES >4: CPT | Mod: Q7,S$PBB,, | Performed by: PODIATRIST

## 2018-09-17 PROCEDURE — 11042 DBRDMT SUBQ TIS 1ST 20SQCM/<: CPT | Mod: 59,S$PBB,, | Performed by: PODIATRIST

## 2018-09-17 PROCEDURE — 99203 OFFICE O/P NEW LOW 30 MIN: CPT | Mod: 25,S$PBB,, | Performed by: PODIATRIST

## 2018-09-17 PROCEDURE — 99999 PR PBB SHADOW E&M-EST. PATIENT-LVL III: CPT | Mod: PBBFAC,,, | Performed by: PODIATRIST

## 2018-09-17 PROCEDURE — 11042 DBRDMT SUBQ TIS 1ST 20SQCM/<: CPT | Mod: PBBFAC,RT,59 | Performed by: PODIATRIST

## 2018-09-17 PROCEDURE — 11721 DEBRIDE NAIL 6 OR MORE: CPT | Mod: Q7,59,S$PBB, | Performed by: PODIATRIST

## 2018-09-17 PROCEDURE — 11721 DEBRIDE NAIL 6 OR MORE: CPT | Mod: Q7,59,PBBFAC | Performed by: PODIATRIST

## 2018-09-17 PROCEDURE — 11057 PARNG/CUTG B9 HYPRKR LES >4: CPT | Mod: 59,Q7,PBBFAC | Performed by: PODIATRIST

## 2018-09-17 PROCEDURE — 99213 OFFICE O/P EST LOW 20 MIN: CPT | Mod: PBBFAC,25 | Performed by: PODIATRIST

## 2018-09-17 NOTE — PROGRESS NOTES
Subjective:       Patient ID: Varghese Paulino is a 32 y.o. male.    Chief Complaint: Routine Foot Care (possible billateral plantar callus no c/o pain, wears slippers w/ socks, non-diabetic pt, PCP  )      HPI: Varghese Paulino presents to the office today, under referral by their Primary Care Provider, [facility], for his annual diabetic foot assessment and risk evalution Patient is a DMII. Patient states amputatin of right 3rd partial ray, ESRD on HD and neuropathy. This patient last saw his/her primary care provider approximately 2 months ago. Patient presents this afternoon stating painful calluses both feet.  He states slight odor from the right foot in the area of prior amputation of 3rd toe.  Patient presents ambulating with flip-flops.  He states control blood sugars.  Patient states the right 3rd digit was amputated back in March/May of 2017 due to bone infection.  Patient was incarcerated at that time.  He states that there was a WoundVac on the area for approximately 1-2 days, then it was removed once he was discharged from hospital and sent back to intermediate.    No results found for: HGBA1C.    Review of patient's allergies indicates:   Allergen Reactions    Bactrim [sulfamethoxazole-trimethoprim]     Sulfa (sulfonamide antibiotics)        Past Medical History:   Diagnosis Date    Hypertension     Pulmonary embolus, left     Renal disorder     dialysis 3x wk       Family History   Problem Relation Age of Onset    Heart disease Mother     Diabetes Mother     Hypertension Father        Social History     Socioeconomic History    Marital status: Single     Spouse name: Not on file    Number of children: Not on file    Years of education: Not on file    Highest education level: Not on file   Social Needs    Financial resource strain: Not on file    Food insecurity - worry: Not on file    Food insecurity - inability: Not on file    Transportation needs - medical: Not on file     "Transportation needs - non-medical: Not on file   Occupational History    Not on file   Tobacco Use    Smoking status: Never Smoker    Smokeless tobacco: Never Used   Substance and Sexual Activity    Alcohol use: No    Drug use: No    Sexual activity: Not on file   Other Topics Concern    Not on file   Social History Narrative    Not on file       Past Surgical History:   Procedure Laterality Date    AV FISTULA PLACEMENT      left antecubical    FOOT SURGERY      right 3rd toe amputated    vas cath placement      r chestwall       Review of Systems   Constitutional: Negative for chills, fatigue and fever.   HENT: Negative for hearing loss.    Eyes: Negative for photophobia and visual disturbance.   Respiratory: Negative for cough, chest tightness, shortness of breath and wheezing.    Cardiovascular: Negative for chest pain and palpitations.   Gastrointestinal: Negative for constipation, diarrhea, nausea and vomiting.   Endocrine: Negative for cold intolerance and heat intolerance.   Genitourinary: Negative for flank pain.   Musculoskeletal: Negative for neck pain and neck stiffness.   Skin: Positive for wound.   Neurological: Positive for numbness. Negative for light-headedness and headaches.   Psychiatric/Behavioral: Negative for sleep disturbance.         Objective:   BP (!) 140/84 (BP Location: Right arm, Patient Position: Sitting, BP Method: Large (Automatic))   Pulse 106   Resp 16   Ht 5' 10.5" (1.791 m)   Wt 68.5 kg (151 lb 0.2 oz)   BMI 21.36 kg/m²     LOWER EXTREMITY PHYSICAL EXAMINATION    NEUROLOGY: Sensation to light touch is intact. Proprioception is intact, bilateral. Sensation to pin prick is reduced to absent. Vibratory sensation is diminished to the left and right lower extremity. Examination with 5.07 [place] Aure monofilament reveals that protective sensation is not intact to the left and right plantar surfaces of the foot and digits, as the patient has no sensation/detection at " greater than 4 distinct points of contact.     VASCULAR: Hair growth is sparse to absent on the left and right dorsal foot and at the digits. The left dorsalis pedis pulse is 1/4 and on the right is 1/4, and the left posterior tibial pulse is  1/4 on the left and is  1/4 on the right. Varicosities absent. Spider veins are absent to the bilateral lower extremity. Warm to warm, proximal to distal. Capillary refill time is within normal limits and less  than 3 seconds.  No edema is noted.    DERMATOLOGY: Skin is supple, dry and intact. No ecchymosis is noted.  Substantial callus formation to the lateral aspect of the right 5th metatarsophalangeal joint, plantar aspect of the left 3rd metatarsophalangeal joint, medial border right great toe, and the plantar surface of the right 3rd ray in the area of partial ray resection, medial border, left great toe.  Upon debridement of the plantar 3rd ray amputation site callus, there is an ulceration noted as the amputation site is not healed.  There is copious malodor, no purulence however is noted. There is an ulceration; subcutaneous tissues with fat layer exposure to the area.  The wound measures approximately 1.5 cm x 1.25 cm with a depth of approximately 5 mm.  Granulation tissues with moderate periwound hyperkeratosis noted.  No probe to bone or osseous exposure is noted. No drainage, purulence or abscess formation is noted. No bone is noted. On the left foot, nails 1-5 are suggestive of onychomycotic changes. On the right foot, nails 1, 2, 4 and 5 are suggestive of onychomycotic changes. These nail plates are painful to palpation and manipulation, are thickened, are dystrophic, chaulky in appearance and malodorous with substantial subungual debris. These nail plates are yellow to brown in appearance. The remaining nail plates are elongated and do not have suggestive clinical features of onychomycosis.  Moderate xerosis is noted.    ORTHOPEDIC: Manual Muscle Testing is 5/5  in all planes on the left and right, without pains, with and without resistance. No pains to palpation of the medial or lateral ankle ligaments. No discomfort to palpation of the posterior tibial tendon, peroneal tendon, Achilles tendon or the anterior ankle tendons.  High arch cavus foot type is noted. No pain upon range of motion of the midfoot or hindfoot joints. No crepitus upon range of motion and midfoot or hindfoot joints. Extensor substitution hammertoes are noted. Plantar flexed metatarsal plantar fat pad atrophy displacement is noted. No ankle pathology is noted. Gait pattern is non-antalgic.  Amputation, right 3rd toe; partial ray resection.      Physical Exam    Assessment:     1. Encounter for comprehensive diabetic foot examination, type 2 diabetes mellitus    2. Diabetic ulcer of other part of right foot associated with type 2 diabetes mellitus, with fat layer exposed    3. Type 2 diabetes mellitus with peripheral neuropathy    4. Acquired absence of other right toe(s)    5. ESRD (end stage renal disease)    6. Xerosis cutis    7. Onychomycosis    8. Callus    9. Hammer toes of both feet    10. Metatarsalgia, right foot    11. Metatarsalgia of left foot    12. Contracture, right ankle    13. Contracture, left ankle    14. Type 2 diabetes mellitus with ESRD (end-stage renal disease)        Plan:     Encounter for comprehensive diabetic foot examination, type 2 diabetes mellitus  Type 2 diabetes mellitus with peripheral neuropathy  Acquired absence of other right toe(s)  Thorough discussion is had with the patient today, concerning the diagnosis, its etiology, and the treatment algorithm at present.  (Diabetic) Foot counseling and education is provided at this visit. Patient is advised to wear socks and shoes at all times.  Do not walk barefoot, or with just socks, even when indoors.  Be sure to check and inspect the inside of the shoe before putting them on her feet.  Protect your feet at all times.   Walking shoes and/or athletic shoes are the best types of shoe gear. Do not wear vinyl or plastic type shoe gear, as they do not stretch and/or breathe.  Protect your feet from hot and/or cold. Elevate the extremities when sitting.  Do not wear excessively tight socks and/or shoe gear. Wiggle your toes for a few minutes throughout the day. Move your ankles up and down, in and out, to help blood flow in your lower extremity.     Diabetic ulcer of other part of right foot associated with type 2 diabetes mellitus, with fat layer exposed  The wound was surgically debrided after adequate prep with alcohol and/or betadine paint. Excisional wound debridement was performed using sharp #10/#15 blade and tissue nipper, with removal of all non-viable skin and soft tissues; necrotic skin/tissue formation. The woundbase/wound bed was also debrided to encourage bleeding as to promote/stimulate healing. Debridement was excisional and included epidermal, dermal and subcutaneous tissues. Post debridement measurements are as above. Hemostasis was achieved. Patient tolerated procedure well and without complications. Local woundcare with topical antibiotic ointment dressings and bandage thereafter.  Patient will continue similar outpatient.  He will follow up in approximately 2 weeks.  For now, continue bilateral surgical shoes for offloading.    ESRD (end stage renal disease)  Type 2 diabetes mellitus with ESRD (end-stage renal disease)  Patient advised to follow up with Nephrologist for management of kidney pathology.    Xerosis cutis  Recommend twice to 3 times daily topical emollient. Did discuss with the patient concerning dry and thin skin in relation to complications due to diabetes.    Onychomycosis  The onychomycotic nail plates, as outlined above, are sharply debrided with double action nail nipper, and/or with the assistance of a mechanical rotary jaymie, with removal of all offending nail and nail border(s), for reduction of  pains. Nails are reduced in terms of length, width and girth with removal of subungual debris to facilitate pain free weight bearing and ambulation. The elongated nails as outlined in the objective portion of this note, were trimmed to appropriate length, with a double action nail nipper, for alleviation/reduction of pains as well. Follow up in approx. 3-4 months.    Callus  Hypertrophic skin formation, as outlined within the examination portion of this note, is/are trimmed/pared surgically debrided with sharp #10/#15 blade, to alleviate discomfort with weight bearing and ambulation, and to lessen the possibility of skin complications, e.g., ulceration due to pressure. No ulceration(s) is are noted with/post debridement.     Hammer toes of both feet  Metatarsalgia, right foot  Metatarsalgia of left foot  Contracture, right ankle  Contracture, left ankle  This patient does have hammertoe (digital) contractures. I did advise the patient to ambulate with shoe gear that is high in the tox box to allow for extra room and depth in the sagittal plane, in order to alleviate and lessen the potential for dorsal digital break down at the IPJs. I do also recommended shoe gear that is soft and supple in the foot bed as to lessen the potential for plantar distal digital break down at the contracted digits. If the patient does not feel the aforementioned is necessary, he or she may also purchase OTC padding devices to be worn across the MTPJ, at the distal aspects of the digits, and/or at the dorsal aspects of the IPJs. The patient does acknowledge understanding and is said to be amenable to compliance.    Upon follow-up, and after the ulceration right foot is healed and resolved, patient will need prescription for diabetic shoe gear.        Protective Sensation (w/ 10 gram monofilament):  Right: Absent  Left: Absent    Visual Inspection:  Ulceration -  Right , Callus -  Bilateral, Dry Skin -  Bilateral and Onychomycosis -   Bilateral    Pedal Pulses:   Right: Diminshed  Left: Diminshed    Posterior tibialis:   Right:Diminshed  Left: Diminshed              Future Appointments   Date Time Provider Department Center   10/1/2018 10:00 AM Eliseo Barone DPM [facility]

## 2018-10-01 ENCOUNTER — TELEPHONE (OUTPATIENT)
Dept: PODIATRY | Facility: CLINIC | Age: 33
End: 2018-10-01

## 2018-10-01 NOTE — TELEPHONE ENCOUNTER
Pt called and said that he can't get in today and that he feels as though his wounds are getting better. He rescheduled for Wed 10/10/18 at 8:00am. I did advise the Pt that is for any reason his wounds begin to digress and become worse to please call us at [phone]. Pt verbalized understanding.    Leti Erwin LPN  Podiatry Surgical Department  [facility]

## 2018-10-01 NOTE — TELEPHONE ENCOUNTER
----- Message from Keysha Kay sent at 10/1/2018 10:32 AM CDT -----  Contact: Patient  Patient called and stated he missed his appointment today due to transportation. He stated he was coming in for a Wound Check and needs someone to call him to reschedule. Please call him at [phone].    Thanks,  Keysha